# Patient Record
Sex: FEMALE | Race: WHITE | Employment: STUDENT | URBAN - METROPOLITAN AREA
[De-identification: names, ages, dates, MRNs, and addresses within clinical notes are randomized per-mention and may not be internally consistent; named-entity substitution may affect disease eponyms.]

---
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
## 2025-04-02 ENCOUNTER — HOSPITAL ENCOUNTER (OUTPATIENT)
Age: 20
Discharge: HOME | End: 2025-04-04
Payer: COMMERCIAL

## 2025-04-02 ENCOUNTER — OFFICE VISIT (OUTPATIENT)
Dept: FAMILY MEDICINE CLINIC | Age: 20
End: 2025-04-02
Payer: COMMERCIAL

## 2025-04-02 ENCOUNTER — HOSPITAL ENCOUNTER (OUTPATIENT)
Dept: GENERAL RADIOLOGY | Age: 20
Discharge: HOME | End: 2025-04-04
Payer: COMMERCIAL

## 2025-04-02 VITALS
SYSTOLIC BLOOD PRESSURE: 98 MMHG | BODY MASS INDEX: 22.22 KG/M2 | HEIGHT: 69 IN | DIASTOLIC BLOOD PRESSURE: 60 MMHG | TEMPERATURE: 97.2 F | HEART RATE: 88 BPM | WEIGHT: 150 LBS | OXYGEN SATURATION: 98 %

## 2025-04-02 DIAGNOSIS — J06.9 UPPER RESPIRATORY INFECTION, ACUTE: ICD-10-CM

## 2025-04-02 DIAGNOSIS — R05.1 ACUTE COUGH: ICD-10-CM

## 2025-04-02 DIAGNOSIS — R59.1 LYMPHADENOPATHY: ICD-10-CM

## 2025-04-02 DIAGNOSIS — R05.1 ACUTE COUGH: Primary | ICD-10-CM

## 2025-04-02 PROBLEM — R09.82 POST-NASAL DRAINAGE: Status: RESOLVED | Noted: 2024-10-04 | Resolved: 2025-04-02

## 2025-04-02 PROCEDURE — 99213 OFFICE O/P EST LOW 20 MIN: CPT | Performed by: NURSE PRACTITIONER

## 2025-04-02 PROCEDURE — 71046 X-RAY EXAM CHEST 2 VIEWS: CPT

## 2025-04-02 RX ORDER — EPINEPHRINE 0.3 MG/.3ML
0.3 INJECTION SUBCUTANEOUS PRN
COMMUNITY
Start: 2024-10-15

## 2025-04-02 RX ORDER — TRETINOIN 0.25 MG/G
CREAM TOPICAL
COMMUNITY
Start: 2025-03-26 | End: 2026-03-27

## 2025-04-02 RX ORDER — MONTELUKAST SODIUM 10 MG/1
10 TABLET ORAL DAILY
COMMUNITY
Start: 2024-10-15 | End: 2026-03-27

## 2025-04-02 SDOH — ECONOMIC STABILITY: FOOD INSECURITY: WITHIN THE PAST 12 MONTHS, YOU WORRIED THAT YOUR FOOD WOULD RUN OUT BEFORE YOU GOT MONEY TO BUY MORE.: NEVER TRUE

## 2025-04-02 SDOH — ECONOMIC STABILITY: FOOD INSECURITY: WITHIN THE PAST 12 MONTHS, THE FOOD YOU BOUGHT JUST DIDN'T LAST AND YOU DIDN'T HAVE MONEY TO GET MORE.: NEVER TRUE

## 2025-04-02 ASSESSMENT — ENCOUNTER SYMPTOMS
SINUS PRESSURE: 1
EYE DISCHARGE: 0
COLOR CHANGE: 0
WHEEZING: 1
COUGH: 1
SHORTNESS OF BREATH: 0
CHEST TIGHTNESS: 0
SINUS PAIN: 1
EYE REDNESS: 0
SORE THROAT: 1

## 2025-04-02 ASSESSMENT — PATIENT HEALTH QUESTIONNAIRE - PHQ9
SUM OF ALL RESPONSES TO PHQ QUESTIONS 1-9: 0
1. LITTLE INTEREST OR PLEASURE IN DOING THINGS: NOT AT ALL
2. FEELING DOWN, DEPRESSED OR HOPELESS: NOT AT ALL
SUM OF ALL RESPONSES TO PHQ QUESTIONS 1-9: 0

## 2025-04-02 NOTE — PROGRESS NOTES
Miriam Tatum (: 2005) is a 19 y.o. 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    Symptoms initially began with a cough that felt higher up in the throat rather than the lungs, accompanied by wheezing and tightness. This progressed to a sore throat and congestion. The sore throat has since resolved, but the congestion has persisted without improvement. The cough and wheezing initially worsened, then slightly improved, but have now plateaued. These symptoms are particularly noticeable when laughing, ascending stairs, or during normal breathing, leading to frequent coughing episodes.    She reports intermittent headaches and mild pain, noting that her current condition is not as severe as a previous sinus infection experienced in the fall. She expresses concern about potential pneumonia or lung infection due to recent exposure to a friend with pneumonia. Currently, she is taking Mucinex, daily Flonase, and Allegra. No postnasal drip has been experienced with the current symptoms. She does not believe an albuterol inhaler is necessary at this time, as she is not experiencing severe breathing difficulties, but notes that her symptoms are impacting her ability to sing.            Results         Review of Systems   Constitutional:  Negative for chills, fever and unexpected weight change.   HENT:  Positive for congestion, sinus pressure, sinus pain and sore throat (resolved at this time). Negative for ear pain.    Eyes:  Negative for discharge and redness.

## 2025-04-04 ENCOUNTER — RESULTS FOLLOW-UP (OUTPATIENT)
Dept: FAMILY MEDICINE CLINIC | Age: 20
End: 2025-04-04